# Patient Record
Sex: FEMALE | Race: WHITE | NOT HISPANIC OR LATINO | Employment: OTHER | URBAN - METROPOLITAN AREA
[De-identification: names, ages, dates, MRNs, and addresses within clinical notes are randomized per-mention and may not be internally consistent; named-entity substitution may affect disease eponyms.]

---

## 2019-06-23 ENCOUNTER — OFFICE VISIT (OUTPATIENT)
Dept: URGENT CARE | Facility: URGENT CARE | Age: 59
End: 2019-06-23
Payer: COMMERCIAL

## 2019-06-23 VITALS
SYSTOLIC BLOOD PRESSURE: 124 MMHG | RESPIRATION RATE: 14 BRPM | WEIGHT: 154 LBS | DIASTOLIC BLOOD PRESSURE: 66 MMHG | BODY MASS INDEX: 27.29 KG/M2 | TEMPERATURE: 98.4 F | HEART RATE: 87 BPM | HEIGHT: 63 IN | OXYGEN SATURATION: 97 %

## 2019-06-23 DIAGNOSIS — B37.31 YEAST INFECTION OF THE VAGINA: ICD-10-CM

## 2019-06-23 DIAGNOSIS — L03.011 PARONYCHIA OF FINGER, RIGHT: Primary | ICD-10-CM

## 2019-06-23 PROCEDURE — 99203 OFFICE O/P NEW LOW 30 MIN: CPT | Performed by: PHYSICIAN ASSISTANT

## 2019-06-23 RX ORDER — LOSARTAN POTASSIUM 100 MG/1
100 TABLET ORAL DAILY
COMMUNITY
Start: 2019-04-03

## 2019-06-23 RX ORDER — SULFAMETHOXAZOLE/TRIMETHOPRIM 800-160 MG
1 TABLET ORAL 2 TIMES DAILY
Qty: 14 TABLET | Refills: 0 | Status: SHIPPED | OUTPATIENT
Start: 2019-06-23 | End: 2019-06-30

## 2019-06-23 RX ORDER — SPIRONOLACTONE 25 MG
20 TABLET ORAL DAILY
COMMUNITY
Start: 2015-09-02

## 2019-06-23 RX ORDER — FLUCONAZOLE 150 MG/1
150 TABLET ORAL ONCE
Qty: 1 TABLET | Refills: 0 | Status: SHIPPED | OUTPATIENT
Start: 2019-06-23 | End: 2019-06-23

## 2019-06-23 ASSESSMENT — ENCOUNTER SYMPTOMS
BRUISES/BLEEDS EASILY: 0
DIZZINESS: 0
DIARRHEA: 0
ALLERGIC/IMMUNOLOGIC NEGATIVE: 1
EYES NEGATIVE: 1
CARDIOVASCULAR NEGATIVE: 1
PALPITATIONS: 0
CHILLS: 0
NAUSEA: 0
NECK STIFFNESS: 0
SHORTNESS OF BREATH: 0
RESPIRATORY NEGATIVE: 1
WOUND: 1
NECK PAIN: 0
COUGH: 0
HEMATOLOGIC/LYMPHATIC NEGATIVE: 1
MYALGIAS: 0
JOINT SWELLING: 0
VOMITING: 0
RHINORRHEA: 0
LIGHT-HEADEDNESS: 0
BACK PAIN: 0
ENDOCRINE NEGATIVE: 1
MUSCULOSKELETAL NEGATIVE: 1
SORE THROAT: 0
FEVER: 0
WEAKNESS: 0
ARTHRALGIAS: 0
HEADACHES: 0

## 2019-06-23 ASSESSMENT — MIFFLIN-ST. JEOR: SCORE: 1242.67

## 2019-06-23 NOTE — PROGRESS NOTES
Chief Complaint:    Chief Complaint   Patient presents with     Hand Pain     Right middle finger is red and very painful.  Started last week Wednesday or Thursday.  Tried some neosporin yesterday        HPI: Samina Milligan is an 58 year old female who presents for evaluation and treatment of possible infection of the R middle finger.  Symptoms started roughly 1 weeks ago and have been worsening.  She has been using antibiotic ointment with no improvement.  She denies any fever.  No numbness or tingling in the finger.    Patient is new to [place].    ROS:      Review of Systems   Constitutional: Negative for chills and fever.   HENT: Negative for congestion, ear pain, rhinorrhea and sore throat.    Eyes: Negative.    Respiratory: Negative.  Negative for cough and shortness of breath.    Cardiovascular: Negative.  Negative for chest pain and palpitations.   Gastrointestinal: Negative for diarrhea, nausea and vomiting.   Endocrine: Negative.    Genitourinary: Negative.    Musculoskeletal: Negative.  Negative for arthralgias, back pain, joint swelling, myalgias, neck pain and neck stiffness.   Skin: Positive for wound. Negative for rash.   Allergic/Immunologic: Negative.  Negative for immunocompromised state.   Neurological: Negative for dizziness, weakness, light-headedness and headaches.   Hematological: Negative.  Does not bruise/bleed easily.     No pertinent family or medical Hx at this time.  Patient is a current smoker.  No pertinent surgical Hx at this time.     Family History   Family History   Problem Relation Age of Onset     Pulmonary fibrosis Brother      Rheumatoid Arthritis Sister        Social History  Social History     Socioeconomic History     Marital status: Single     Spouse name: Not on file     Number of children: Not on file     Years of education: Not on file     Highest education level: Not on file   Occupational History     Not on file   Social Needs     Financial resource strain: Not on  file     Food insecurity:     Worry: Not on file     Inability: Not on file     Transportation needs:     Medical: Not on file     Non-medical: Not on file   Tobacco Use     Smoking status: Former Smoker     Smokeless tobacco: Never Used   Substance and Sexual Activity     Alcohol use: Yes     Drug use: No     Sexual activity: Not on file   Lifestyle     Physical activity:     Days per week: Not on file     Minutes per session: Not on file     Stress: Not on file   Relationships     Social connections:     Talks on phone: Not on file     Gets together: Not on file     Attends Baptism service: Not on file     Active member of club or organization: Not on file     Attends meetings of clubs or organizations: Not on file     Relationship status: Not on file     Intimate partner violence:     Fear of current or ex partner: Not on file     Emotionally abused: Not on file     Physically abused: Not on file     Forced sexual activity: Not on file   Other Topics Concern     Parent/sibling w/ CABG, MI or angioplasty before 65F 55M? Not Asked   Social History Narrative     Not on file        Surgical History:  Past Surgical History:   Procedure Laterality Date     COLONOSCOPY  11/11/2010    COMBINED COLONOSCOPY, REMOVE TUMOR/POLYP/LESION BY SNARE performed by BUSTER BARCLAY at [facility]     COLONOSCOPY N/A 11/30/2015    Procedure: COMBINED COLONOSCOPY, SINGLE OR MULTIPLE BIOPSY/POLYPECTOMY BY BIOPSY;  Surgeon: Buster Barclay MD;  Location: [facility]        Problem List:  Patient Active Problem List   Diagnosis     Nondependent alcohol abuse     Essential hypertension        Allergies:  Allergies   Allergen Reactions     Erythromycin Estolate GI Disturbance     Penicillin [Penicillins] Diarrhea     Varenicline Other (See Comments)     CHANTIX  suicidal thoughts        Current Meds:    Current Outpatient Medications:      Calcium Citrate-Vitamin D (CITRACAL + D) 250-200 MG-UNIT TABS, Take 1 tablet by mouth, Disp: , Rfl:       "fluconazole (DIFLUCAN) 150 MG tablet, Take 1 tablet (150 mg) by mouth once for 1 dose, Disp: 1 tablet, Rfl: 0     losartan (COZAAR) 100 MG tablet, Take 100 mg by mouth daily, Disp: , Rfl:      Lutein 20 MG CAPS, Take 20 mg by mouth daily, Disp: , Rfl:      Multiple Vitamin (MULTI-VITAMINS) TABS, Take 1 tablet by mouth, Disp: , Rfl:      sulfamethoxazole-trimethoprim (BACTRIM DS/SEPTRA DS) 800-160 MG tablet, Take 1 tablet by mouth 2 times daily for 7 days, Disp: 14 tablet, Rfl: 0     VITAMIN D, CHOLECALCIFEROL, PO, Take 1,200 Units by mouth daily , Disp: , Rfl:      ibuprofen (ADVIL,MOTRIN) 200 MG tablet, Take 400 mg by mouth, Disp: , Rfl:      multivitamin  with lutein (OCUVITE WITH LTEIN) CAPS, Take 1 capsule by mouth daily, Disp: , Rfl:      PHYSICAL EXAM:     Vital signs noted and reviewed by Dillon Acosta  /66 (BP Location: Right arm, Patient Position: Chair, Cuff Size: Adult Regular)   Pulse 87   Temp 98.4  F (36.9  C) (Tympanic)   Resp 14   Ht 1.6 m (5' 3\")   Wt 69.9 kg (154 lb)   SpO2 97%   BMI 27.28 kg/m       PEFR:    Physical Exam   Constitutional: She is oriented to person, place, and time. She appears well-developed and well-nourished. She is cooperative.  Non-toxic appearance. She does not have a sickly appearance. She does not appear ill. No distress.   HENT:   Head: Normocephalic and atraumatic.   Right Ear: Tympanic membrane and external ear normal. Tympanic membrane is not perforated, not erythematous, not retracted and not bulging.   Left Ear: Tympanic membrane and external ear normal. Tympanic membrane is not perforated, not erythematous, not retracted and not bulging.   Nose: No mucosal edema or rhinorrhea.   Mouth/Throat: Oropharynx is clear and moist. No oropharyngeal exudate, posterior oropharyngeal edema, posterior oropharyngeal erythema or tonsillar abscesses.   Eyes: Pupils are equal, round, and reactive to light. EOM are normal.   Neck: Trachea normal, normal range of motion " and full passive range of motion without pain. Neck supple.   Cardiovascular: Normal rate, regular rhythm, S1 normal, S2 normal, normal heart sounds and intact distal pulses. Exam reveals no gallop and no friction rub.   No murmur heard.  Pulmonary/Chest: Effort normal and breath sounds normal. No respiratory distress. She has no decreased breath sounds. She has no wheezes. She has no rhonchi. She has no rales.   Abdominal: Soft. Bowel sounds are normal. She exhibits no distension and no mass. There is no hepatosplenomegaly. There is no tenderness. There is no rigidity, no rebound, no guarding and no CVA tenderness.   Musculoskeletal:        Right hand: She exhibits tenderness and swelling. She exhibits normal range of motion, no bony tenderness and no deformity.   Erythema and swelling of the R distal middle finger.  No visible abscess.  Full range of motion of digit.  Digit is neurologically intact.     Lymphadenopathy:     She has no cervical adenopathy.   Neurological: She is alert and oriented to person, place, and time. She has normal reflexes. No cranial nerve deficit.   Skin: Skin is warm and dry. She is not diaphoretic.   Psychiatric: She has a normal mood and affect. Her behavior is normal. Judgment and thought content normal.   Nursing note and vitals reviewed.       Labs:     Results for orders placed or performed during the hospital encounter of 11/30/15   Surgical pathology exam   Result Value Ref Range    Copath Report       Patient Name: ABIODUN UNGER  MR#: [id number]  Specimen #: [id number]  Collected: 11/30/2015  Received: 11/30/2015  Reported: 12/2/2015 14:16  Ordering Phy(s): BUSTER BARCLAY    SPECIMEN(S):  Sigmoid colon polyp    FINAL DIAGNOSIS:  Sigmoid colon polyp:  - Hyperplastic polyp.    Electronically signed out by:    ALLISON Frazier M.D.    CLINICAL HISTORY:  55 year-old female, screening colonoscopy.    GROSS:  A single specimen container with formalin is received labeled with  "the  patient's name, date of birth, and medical record number.  Information  on the requisition slip, container, and associated labels is confirmed.    The specimen is designated \"sigmoid colon polyp\" consisting of two tan  portions of tissue which are 3 mm and 5 mm in length. The entire  specimen is filtered over a tissue wrap.  All submitted in one cassette.  (Dictated by: NASRA Frazier MD 11/30/2015 03:32 PM)    MICROSCOPIC:  Microscopic examination was performed. (Dictated by: NASRA Frazier MD  12/02/20 15)    CPT Codes:  A: 54751-ZO6    TESTING LAB LOCATION:  [facility], [address] [place] [id number] [phone]    COLLECTION SITE:  Client: [facility]  Location: [facility] ([facility])         Medical Decision Making:    Differential Diagnosis:  Paronychia     ASSESSMENT:     1. Paronychia of finger, right    2. Yeast infection of the vagina           PLAN:     Rx for Bactrim today.  Rx for Diflucan as she tends to get yeast infections with antibiotic use.  Patient instructed to start finger soaks 2 times per day until resolved.  She will follow up with her PCP in 1 week if symptoms are not improving.  Worrisome symptoms discussed with instructions to go to the ED.  Patient verbalized understanding and agreed with this plan.     Dillon Acosta  6/23/2019, 11:22 AM    "

## 2019-06-23 NOTE — NURSING NOTE
"Chief Complaint   Patient presents with     Hand Pain     Right middle finger is red and very painful.  Started last week Wednesday or Thursday.  Tried some neosporin yesterday        Initial /66 (BP Location: Right arm, Patient Position: Chair, Cuff Size: Adult Regular)   Pulse 87   Temp 98.4  F (36.9  C) (Tympanic)   Resp 14   Ht 1.6 m (5' 3\")   Wt 69.9 kg (154 lb)   SpO2 97%   BMI 27.28 kg/m   Estimated body mass index is 27.28 kg/m  as calculated from the following:    Height as of this encounter: 1.6 m (5' 3\").    Weight as of this encounter: 69.9 kg (154 lb).    Patient presents to the clinic using No DME    Health Maintenance that is potentially due pending provider review:  NONE    n/a    Is there anyone who you would like to be able to receive your results? No  If yes have patient fill out JOHNNY    Nathen Wagoner M.A.      "

## 2022-07-06 ENCOUNTER — OFFICE VISIT (OUTPATIENT)
Dept: URGENT CARE | Facility: URGENT CARE | Age: 62
End: 2022-07-06
Payer: COMMERCIAL

## 2022-07-06 VITALS
SYSTOLIC BLOOD PRESSURE: 144 MMHG | WEIGHT: 121 LBS | DIASTOLIC BLOOD PRESSURE: 82 MMHG | TEMPERATURE: 97.6 F | BODY MASS INDEX: 21.43 KG/M2 | OXYGEN SATURATION: 98 % | HEART RATE: 75 BPM

## 2022-07-06 DIAGNOSIS — R09.82 PND (POST-NASAL DRIP): ICD-10-CM

## 2022-07-06 DIAGNOSIS — H92.01 RIGHT EAR PAIN: Primary | ICD-10-CM

## 2022-07-06 DIAGNOSIS — H69.91 DYSFUNCTION OF RIGHT EUSTACHIAN TUBE: ICD-10-CM

## 2022-07-06 PROBLEM — K22.70 BARRETT'S ESOPHAGUS: Status: ACTIVE | Noted: 2022-02-15

## 2022-07-06 PROBLEM — K25.9 GASTRIC ULCER: Status: ACTIVE | Noted: 2018-04-03

## 2022-07-06 PROBLEM — M81.0 OSTEOPOROSIS: Status: ACTIVE | Noted: 2021-05-20

## 2022-07-06 PROCEDURE — 99203 OFFICE O/P NEW LOW 30 MIN: CPT | Performed by: PHYSICIAN ASSISTANT

## 2022-07-06 RX ORDER — LOSARTAN POTASSIUM 100 MG/1
1 TABLET ORAL DAILY
COMMUNITY
Start: 2021-09-03

## 2022-07-06 RX ORDER — FLUTICASONE PROPIONATE 50 MCG
2 SPRAY, SUSPENSION (ML) NASAL DAILY
Qty: 15.8 ML | Refills: 0 | Status: SHIPPED | OUTPATIENT
Start: 2022-07-06

## 2022-07-06 RX ORDER — PANTOPRAZOLE SODIUM 40 MG/1
40 TABLET, DELAYED RELEASE ORAL DAILY
COMMUNITY

## 2022-07-06 RX ORDER — ALENDRONATE SODIUM 70 MG/1
70 TABLET ORAL
COMMUNITY
Start: 2020-08-28

## 2022-07-06 ASSESSMENT — ENCOUNTER SYMPTOMS
ARTHRALGIAS: 0
MYALGIAS: 0
RHINORRHEA: 0
NECK PAIN: 0
WOUND: 0
DIZZINESS: 0
BACK PAIN: 0
HEADACHES: 0
DIARRHEA: 0
WEAKNESS: 0
VOMITING: 0
LIGHT-HEADEDNESS: 0
NAUSEA: 0
ALLERGIC/IMMUNOLOGIC NEGATIVE: 1
NECK STIFFNESS: 0
SHORTNESS OF BREATH: 0
ENDOCRINE NEGATIVE: 1
PALPITATIONS: 0
COUGH: 1
CHILLS: 0
SORE THROAT: 0
FEVER: 0
MUSCULOSKELETAL NEGATIVE: 1
CARDIOVASCULAR NEGATIVE: 1
EYES NEGATIVE: 1
JOINT SWELLING: 0

## 2022-07-06 NOTE — PROGRESS NOTES
Chief Complaint:     Chief Complaint   Patient presents with     Ear Problem     Pain in right ear, makes her cough, started after she had covid May 5th.  Coughing makes her ear hurt worse and nose plugs up. Completed a dose of Augmentin and still sick       No results found for any visits on 07/06/22.    Medical Decision Making:    Vital signs reviewed by Dillon Acosta PA-C  BP (!) 144/82   Pulse 75   Temp 97.6  F (36.4  C) (Tympanic)   Wt 54.9 kg (121 lb)   SpO2 98%   BMI 21.43 kg/m      Differential Diagnosis:  URI Adult/Peds:  Acute right otitis media, Bronchitis-viral, Pneumonia, Viral syndrome and Viral upper respiratory illness        ASSESSMENT    1. Right ear pain    2. Dysfunction of right eustachian tube    3. PND (post-nasal drip)        PLAN    Patient is in no acute distress.    Temp is 97.6 in clinic today, lung sounds were clear, and O2 sats at 98% on RA.    Rx for Flonase sent in.  Rest, Push fluids, vaporizer.  Ibuprofen and or Tylenol for any fever or body aches.  If symptoms worsen, recheck immediately otherwise follow up with your PCP in 1 week if symptoms are not improving.  Worrisome symptoms discussed with instructions to go to the ED.  Patient verbalized understanding and agreed with this plan.    Labs:    No results found for any visits on 07/06/22.     Vital signs reviewed by Dillon Acosta PA-C  BP (!) 144/82   Pulse 75   Temp 97.6  F (36.4  C) (Tympanic)   Wt 54.9 kg (121 lb)   SpO2 98%   BMI 21.43 kg/m      Current Meds      Current Outpatient Medications:      alendronate (FOSAMAX) 70 MG tablet, Take 70 mg by mouth, Disp: , Rfl:      Calcium Citrate-Vitamin D 250-200 MG-UNIT TABS, Take 1 tablet by mouth, Disp: , Rfl:      fluticasone (FLONASE) 50 MCG/ACT nasal spray, Spray 2 sprays into both nostrils daily, Disp: 15.8 mL, Rfl: 0     losartan (COZAAR) 100 MG tablet, Take 100 mg by mouth daily, Disp: , Rfl:      Lutein 20 MG CAPS, Take 20 mg by mouth daily, Disp: , Rfl:       pantoprazole (PROTONIX) 40 MG EC tablet, Take 40 mg by mouth daily, Disp: , Rfl:      VITAMIN D, CHOLECALCIFEROL, PO, Take 1,200 Units by mouth daily , Disp: , Rfl:      amoxicillin-clavulanate (AUGMENTIN) 875-125 MG tablet, TAKE ONE TABLET BY MOUTH TWICE DAILY FOR 10 DAYS (Patient not taking: Reported on 7/6/2022), Disp: , Rfl:      ibuprofen (ADVIL,MOTRIN) 200 MG tablet, Take 400 mg by mouth (Patient not taking: Reported on 7/6/2022), Disp: , Rfl:      losartan (COZAAR) 100 MG tablet, Take 1 tablet by mouth daily (Patient not taking: Reported on 7/6/2022), Disp: , Rfl:      Multiple Vitamin (MULTI-VITAMINS) TABS, Take 1 tablet by mouth (Patient not taking: Reported on 7/6/2022), Disp: , Rfl:      multivitamin  with lutein (OCUVITE WITH LTEIN) CAPS, Take 1 capsule by mouth daily (Patient not taking: Reported on 7/6/2022), Disp: , Rfl:       Respiratory History    occasional episodes of bronchitis      SUBJECTIVE    HPI: Samina Milligan is an 61 year old female who presents with chest congestion, cough nonproductive, occasional and ear pain right.  Patient states that she has had symptoms since COVID in May of this year.  There is no shortness of breath, wheezing and chest pain.  Patient is eating and drinking well.  No fever, nausea, vomiting, or diarrhea.    Patient denies any recent travel or exposure to known COVID positive tested individual.      Patient is new to [facility].    ROS:     Review of Systems   Constitutional: Negative for chills and fever.   HENT: Positive for congestion and ear pain. Negative for rhinorrhea and sore throat.    Eyes: Negative.    Respiratory: Positive for cough. Negative for shortness of breath.    Cardiovascular: Negative.  Negative for chest pain and palpitations.   Gastrointestinal: Negative for diarrhea, nausea and vomiting.   Endocrine: Negative.    Genitourinary: Negative.    Musculoskeletal: Negative.  Negative for arthralgias, back pain, joint swelling,  myalgias, neck pain and neck stiffness.   Skin: Negative.  Negative for rash and wound.   Allergic/Immunologic: Negative.  Negative for immunocompromised state.   Neurological: Negative for dizziness, weakness, light-headedness and headaches.         Family History   Family History   Problem Relation Age of Onset     Pulmonary fibrosis Brother      Rheumatoid Arthritis Sister         Problem history  Patient Active Problem List   Diagnosis     Nondependent alcohol abuse     Essential hypertension     Benito's esophagus     Gastric ulcer     Osteoporosis     UGI bleed        Allergies  Allergies   Allergen Reactions     Erythromycin Estolate GI Disturbance     Penicillin [Penicillins] Diarrhea     Varenicline Other (See Comments)     CHANTIX  suicidal thoughts        Social History  Social History     Socioeconomic History     Marital status: Single     Spouse name: Not on file     Number of children: Not on file     Years of education: Not on file     Highest education level: Not on file   Occupational History     Not on file   Tobacco Use     Smoking status: Former Smoker     Smokeless tobacco: Never Used   Substance and Sexual Activity     Alcohol use: Yes     Drug use: No     Sexual activity: Not on file   Other Topics Concern     Parent/sibling w/ CABG, MI or angioplasty before 65F 55M? Not Asked   Social History Narrative     Not on file     Social Determinants of Health     Financial Resource Strain: Not on file   Food Insecurity: Not on file   Transportation Needs: Not on file   Physical Activity: Not on file   Stress: Not on file   Social Connections: Not on file   Intimate Partner Violence: Not on file   Housing Stability: Not on file        OBJECTIVE     Vital signs reviewed by Dillon Acosta PA-C  BP (!) 144/82   Pulse 75   Temp 97.6  F (36.4  C) (Tympanic)   Wt 54.9 kg (121 lb)   SpO2 98%   BMI 21.43 kg/m       Physical Exam  Vitals and nursing note reviewed.   Constitutional:       General: She  is not in acute distress.     Appearance: She is well-developed. She is not ill-appearing, toxic-appearing or diaphoretic.   HENT:      Head: Normocephalic and atraumatic.      Right Ear: Hearing, tympanic membrane, ear canal and external ear normal. Tympanic membrane is not perforated, erythematous, retracted or bulging.      Left Ear: Hearing, tympanic membrane, ear canal and external ear normal. Tympanic membrane is not perforated, erythematous, retracted or bulging.      Nose: Congestion present. No mucosal edema or rhinorrhea.      Right Sinus: No maxillary sinus tenderness or frontal sinus tenderness.      Left Sinus: No maxillary sinus tenderness or frontal sinus tenderness.      Mouth/Throat:      Pharynx: No pharyngeal swelling, oropharyngeal exudate, posterior oropharyngeal erythema or uvula swelling.      Tonsils: No tonsillar exudate or tonsillar abscesses. 0 on the right. 0 on the left.   Eyes:      General:         Right eye: No discharge.         Left eye: No discharge.      Pupils: Pupils are equal, round, and reactive to light.   Cardiovascular:      Rate and Rhythm: Normal rate and regular rhythm.      Heart sounds: Normal heart sounds. No murmur heard.    No friction rub. No gallop.   Pulmonary:      Effort: Pulmonary effort is normal. No respiratory distress.      Breath sounds: Normal breath sounds. No decreased breath sounds, wheezing, rhonchi or rales.   Chest:      Chest wall: No tenderness.   Abdominal:      General: Bowel sounds are normal. There is no distension.      Palpations: Abdomen is soft. There is no mass.      Tenderness: There is no abdominal tenderness. There is no guarding.   Musculoskeletal:      Cervical back: Normal range of motion and neck supple.   Lymphadenopathy:      Head:      Right side of head: No submental, submandibular, tonsillar, preauricular or posterior auricular adenopathy.      Left side of head: No submental, submandibular, tonsillar, preauricular or  posterior auricular adenopathy.      Cervical: No cervical adenopathy.      Right cervical: No superficial or posterior cervical adenopathy.     Left cervical: No superficial or posterior cervical adenopathy.   Skin:     General: Skin is warm and dry.      Findings: No rash.   Neurological:      Mental Status: She is alert and oriented to person, place, and time.      Cranial Nerves: No cranial nerve deficit.      Deep Tendon Reflexes: Reflexes are normal and symmetric.   Psychiatric:         Behavior: Behavior normal. Behavior is cooperative.         Thought Content: Thought content normal.         Judgment: Judgment normal.           Dillon Acosta PA-C  7/6/2022, 2:17 PM

## 2024-12-30 ENCOUNTER — OFFICE VISIT (OUTPATIENT)
Dept: URGENT CARE | Facility: URGENT CARE | Age: 64
End: 2024-12-30
Payer: COMMERCIAL

## 2024-12-30 VITALS
DIASTOLIC BLOOD PRESSURE: 101 MMHG | WEIGHT: 113 LBS | BODY MASS INDEX: 20.02 KG/M2 | HEART RATE: 76 BPM | RESPIRATION RATE: 16 BRPM | SYSTOLIC BLOOD PRESSURE: 162 MMHG | OXYGEN SATURATION: 99 % | TEMPERATURE: 98.3 F

## 2024-12-30 DIAGNOSIS — H43.391 VITREOUS FLOATERS OF RIGHT EYE: Primary | ICD-10-CM

## 2024-12-30 PROCEDURE — 99213 OFFICE O/P EST LOW 20 MIN: CPT | Performed by: NURSE PRACTITIONER

## 2024-12-30 NOTE — PROGRESS NOTES
SUBJECTIVE:   Samina Milligan is a 64 year old female presenting with a chief complaint of   Chief Complaint   Patient presents with    Visual Disturbance     Visual change started around noon today, sees a black line on right eye, looks like a black snake   No hx of eye issues, wears glasses.  Pt had lasic surgery in the past at least 2 years ago.  No visual changes just has the line.   Pt has had grey floaters but nothing black up to now.    Past Medical History:   Diagnosis Date    Hypertension     PONV (postoperative nausea and vomiting)      Family History   Problem Relation Age of Onset    Pulmonary fibrosis Brother     Rheumatoid Arthritis Sister      Current Outpatient Medications   Medication Sig Dispense Refill    alendronate (FOSAMAX) 70 MG tablet Take 70 mg by mouth      Calcium Citrate-Vitamin D 250-200 MG-UNIT TABS Take 1 tablet by mouth      Lutein 20 MG CAPS Take 20 mg by mouth daily      pantoprazole (PROTONIX) 40 MG EC tablet Take 40 mg by mouth daily      amoxicillin-clavulanate (AUGMENTIN) 875-125 MG tablet TAKE ONE TABLET BY MOUTH TWICE DAILY FOR 10 DAYS (Patient not taking: Reported on 12/30/2024)      fluticasone (FLONASE) 50 MCG/ACT nasal spray Spray 2 sprays into both nostrils daily (Patient not taking: Reported on 12/30/2024) 15.8 mL 0    ibuprofen (ADVIL,MOTRIN) 200 MG tablet Take 400 mg by mouth (Patient not taking: Reported on 12/30/2024)      losartan (COZAAR) 100 MG tablet Take 1 tablet by mouth daily (Patient not taking: Reported on 12/30/2024)      losartan (COZAAR) 100 MG tablet Take 100 mg by mouth daily (Patient not taking: Reported on 12/30/2024)      Multiple Vitamin (MULTI-VITAMINS) TABS Take 1 tablet by mouth (Patient not taking: Reported on 12/30/2024)      multivitamin  with lutein (OCUVITE WITH LTEIN) CAPS Take 1 capsule by mouth daily (Patient not taking: Reported on 12/30/2024)      VITAMIN D, CHOLECALCIFEROL, PO Take 1,200 Units by mouth daily  (Patient not taking:  Reported on 12/30/2024)       Social History     Tobacco Use    Smoking status: Former    Smokeless tobacco: Never   Substance Use Topics    Alcohol use: Yes       OBJECTIVE  BP (!) 162/101   Pulse 76   Temp 98.3  F (36.8  C) (Tympanic)   Resp 16   Wt 51.3 kg (113 lb)   SpO2 99%   BMI 20.02 kg/m      Physical Exam  Constitutional:       Appearance: Normal appearance.   HENT:      Head: Normocephalic and atraumatic.      Right Ear: Tympanic membrane normal.      Left Ear: Tympanic membrane normal.      Nose: Nose normal.   Eyes:      Extraocular Movements: Extraocular movements intact.      Conjunctiva/sclera: Conjunctivae normal.      Pupils: Pupils are equal, round, and reactive to light.   Skin:     General: Skin is warm and dry.   Neurological:      Mental Status: She is alert.   Psychiatric:         Mood and Affect: Mood normal.         ASSESSMENT:    1. Vitreous floaters of right eye (Primary)    - Adult Eye  Referral; Future    PLAN:  Follow up with eye doctor in the next 1-3 days.  ER if anything changes in the mean time.